# Patient Record
Sex: MALE | Race: WHITE | NOT HISPANIC OR LATINO | URBAN - METROPOLITAN AREA
[De-identification: names, ages, dates, MRNs, and addresses within clinical notes are randomized per-mention and may not be internally consistent; named-entity substitution may affect disease eponyms.]

---

## 2017-08-23 NOTE — PATIENT DISCUSSION
OS change centrally on OCT vs past.  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  Start with Ilevro qhs OS and Durezol bid OS and see retina in about 2 weeks to further eval.

## 2017-09-15 PROBLEM — H34.8322: Noted: 2017-09-15

## 2017-10-20 NOTE — PATIENT DISCUSSION
Based on today's exam, diagnostic studies, and review of records, along with the patient’s recent onset of functional difficulties which appear to be caused by the vitreous debris, it was recommended the PATIENT WAIT A FEW MONTHS to see if the vitreous debris clears on its own.

## 2017-10-20 NOTE — PATIENT DISCUSSION
Pt elects surgery. Will see patient in 4-5  weeks for Judson Joshi. Will discuss and schedule surgery for December at that time.

## 2017-10-20 NOTE — PATIENT DISCUSSION
Discussed possible vitrectomy due to patient being unhappy with current  VA. Patient states that his vision inhibits some daily activities. Floater affecting ADL. Pt states that vision started decreasing about 6 months after cataract surgery. Floaters started to appear in vision and since then [place] has been decreased.

## 2017-10-20 NOTE — PATIENT DISCUSSION
Pt educated that this condition causes a distortion in vision. As patient has no complaints od distortion, pt edu that VA complaints are likely due to floaters.

## 2017-11-20 NOTE — PATIENT DISCUSSION
Pt educated discontinue use of blood thinners 1 week prior to day of surgery, Pt educated vision will be blurry several  months after surgery and 25g PPV MP ICG Recommended.

## 2017-11-20 NOTE — PATIENT DISCUSSION
Discussed vtrectomy due to patient being unhappy with current  VA. Patient states that his vision inhibits some daily activities. Floater affecting ADL. Pt states that vision started decreasing about 6 months after cataract surgery. Floaters started to appear in vision and since then [place] has been decreased.

## 2017-12-14 NOTE — PATIENT DISCUSSION
Good PO progress. Post op instructions given to patient and verbalized understanding. Stressed [facility] will take 6m- 1yr to reach full potential. Most VA improvement in the first two months. GTTS instructions given to patient. No strenuous activity, no swimming etc. Can d/c face down position but no flat on back. May want to add additional laser in office. Ok to have MOHS surgery next week. Advised pt to call immediately if any eye pain or vision loss. 24/7 ER reviewed. Impossible to remove 100% of floaters. Decrease PA to QID AND Moxi continue for 2 more days then stop. Ok to play golf on Thursday.

## 2017-12-15 NOTE — PATIENT DISCUSSION
Good PO progress. Post op instructions given to patient and verbalized understanding. [facility] will take 6m- 1yr to reach full potential. Most VA improvement in the first two months. GTTS instructions given to patient. No strenuous activity, no swimming etc. Recc face down position for next 2-3 days. May want to add additional laser in office. Ok to have MOHS surgery next week. Advised pt to call immediately if any eye pain or vision loss. 24/7 ER reviewed. Impossible to remove 100% of floaters.

## 2017-12-19 NOTE — PATIENT DISCUSSION
Good PO progress. Post op instructions given to patient and verbalized understanding. [facility] will take 6m- 1yr to reach full potential. Most VA improvement in the first two months. GTTS instructions given to patient. No strenuous activity, no swimming etc. Can d/c face down position but no flat on back. May want to add additional laser in office. Ok to have MOHS surgery next week. Advised pt to call immediately if any eye pain or vision loss. 24/7 ER reviewed. Impossible to remove 100% of floaters. Decrease PA to QID AND Moxi continue for 2 more days then stop. Ok to play golf on Thursday.

## 2018-01-08 NOTE — PATIENT DISCUSSION
Good PO progress. Post op instructions given to patient and verbalized understanding. [facility] will take 6m- 1yr to reach full potential. Most VA improvement in the first two months. GTTS instructions given to patient. Continue with Pred A QID.  No strenuous activity, no swimming etc. May want to add additional laser in office.  Advised pt to call immediately if any eye pain or vision loss. 24/7 ER reviewed. Impossible to remove 100% of floaters.

## 2018-01-29 NOTE — PATIENT DISCUSSION
Decrease Prednisolone to two times a day for the next two weeks. Then decrease to one drop per day for the next two weeks.

## 2018-04-04 NOTE — PROCEDURE NOTE: CLINICAL
PROCEDURE NOTE: Biopsy of Eyelid Right Upper Lid. Diagnosis: Eyelid Lesion, Uncertain Behavior. Prior to treatment, the risks/benefits/alternatives were discussed. The patient wished to proceed with procedure. The area of the surgical site was prepped surgical scrub. 0.5 cc of 2% lidocaine with epinephrine was injected beneath the lesion. The lesion was excised with Nestor scissors. The defect was cauterized. Erythromycin ointment was placed on the biopsy site. Patient tolerated procedure well. There were no complications. The lesion was placed in formalin and sent to a pathology lab. Post procedure instructions given. Jaciel Damon

## 2018-04-04 NOTE — PATIENT DISCUSSION
Recommended excision/biopsy due to enlarging. Specimen sent for pathology. Pt tolerated procedur well.

## 2018-04-23 NOTE — PATIENT DISCUSSION
Recommended excision/biopsy due to enlarging. Specimen sent for pathology. Pt tolerated procedure well.

## 2020-01-15 NOTE — PATIENT DISCUSSION
Visually significant, recommend PPV/MP, patient elects to hold off on surgery for now, follow up in 2 months to further discuss surgery.

## 2020-01-22 NOTE — PATIENT DISCUSSION
Will remove at Dr. Kaylie Cobos and patient's request.  Due to size and location recommend removing in 2-3 stages.  Patient desires to proceed.  Consent form signed.  Photos taken.  Yair RUBIN until healed (pt has).  Follow up 2-3 weeks for removal with Laurel Cunningham PA-C.

## 2020-01-30 NOTE — PROCEDURE NOTE: SURGICAL
Prior to commencing surgery, Dr. Sumanth Carr confirmed patient identification, surgical procedure, site and side. Following topical proparacaine anesthesia, the patient was positioned at the YAG laser, a contact lens was coupled to the cornea of the right eye with methylcellulose and an axial posterior capsulotomy was performed without complication using 2.9 Mj x 23. Attention was turned to the left eye and a contact lens was coupled to the cornea of the left eye with methylcellulose and an axial posterior capsulotomy was performed without complication using 3.0 Mj x 30. Excess methylcellulose was washed from both eyes, one drop of Alphagan was instilled in each eye and the patient returned to the holding area having tolerated the procedure well and without complication. <br />[id number]<QQ /><br />

## 2020-02-05 NOTE — PROCEDURE NOTE: CLINICAL
PROCEDURE NOTE: Excision of Eyelid Lesion Right Upper Lid. Diagnosis: Eyelid Lesion, Benign. Risks, benefits and alternatives discussed. Patient desires to proceed with excision of growth/lesion today. The involved area was anesthetized using 1% lidocaine with epi. The lesion was excised using mini Westcotts and forceps and discarded. The wound was cauterized to achieve hemostasis. Erythromycin ophthalmic ointment was applied. Post op instructions were given to the patient. The patient tolerated the procedure well and left in good condition. The patient understands to call us for any concerns. Jordana Walker

## 2020-02-05 NOTE — PATIENT DISCUSSION
Patient desires to have removed and is aware that it will take at least one more time for complete removal.   Consent form signed.  Photos taken.  Scab from previous excision also removed.  Expressed importance of keeping the wound moist with Yair cadence TID until it heals.  Patient may return in 2-3 weeks for further removal.

## 2020-02-24 NOTE — PATIENT DISCUSSION
Patient desires to have remainder of growth removed today.  Consent form signed.  Photos taken.  Expressed importance of keeping the wound moist with Yair cadence TID until it heals.  Follow up prn.

## 2020-02-24 NOTE — PROCEDURE NOTE: CLINICAL
PROCEDURE NOTE: Excision of Eyelid Lesion Right Upper Lid. Diagnosis: Eyelid Lesion, Benign. Risks, benefits and alternatives discussed. Patient desires to proceed with excision of growth/lesion today. A drop of proparacaine was instilled in the involved eye. A tetracaine drop was used on a cotton tipped applicator and placed on the conjunctiva. The involved area was anesthetized using 1% lidocaine with epi. The lesion was excised using mini Westcotts and forceps and discarded. The wound was cauterized to achieve hemostasis. Erythromycin ophthalmic ointment was applied. Post op instructions were given to the patient. The patient tolerated the procedure well and left in good condition. The patient understands to call us for any concerns. Brandee Moore

## 2021-08-03 NOTE — PATIENT DISCUSSION
Doing well, NO new TEARS/BREAKS/DETACHMENT seen on exam TODAY. I called [facility] and left a msg with the verbal ok Per Dr. Roberson for the orders.  Sangita Khan MA

## 2021-12-29 ENCOUNTER — NEW PATIENT (OUTPATIENT)
Dept: URBAN - METROPOLITAN AREA CLINIC 53 | Facility: CLINIC | Age: 61
End: 2021-12-29

## 2021-12-29 DIAGNOSIS — H25.813: ICD-10-CM

## 2021-12-29 DIAGNOSIS — H34.8322: ICD-10-CM

## 2021-12-29 PROCEDURE — 92134 CPTRZ OPH DX IMG PST SGM RTA: CPT

## 2021-12-29 PROCEDURE — 92004 COMPRE OPH EXAM NEW PT 1/>: CPT

## 2021-12-29 ASSESSMENT — VISUAL ACUITY
OD_GLARE: 20/20
OD_SC: J4
OS_SC: J4
OS_SC: 20/20
OS_GLARE: 20/20
OD_GLARE: 20/20
OS_GLARE: 20/20
OD_SC: 20/20

## 2021-12-29 ASSESSMENT — TONOMETRY
OS_IOP_MMHG: 18
OD_IOP_MMHG: 18

## 2022-01-11 ENCOUNTER — CONSULTATION/EVALUATION (OUTPATIENT)
Dept: URBAN - METROPOLITAN AREA CLINIC 53 | Facility: CLINIC | Age: 62
End: 2022-01-11

## 2022-01-11 DIAGNOSIS — H25.813: ICD-10-CM

## 2022-01-11 DIAGNOSIS — H40.013: ICD-10-CM

## 2022-01-11 DIAGNOSIS — H34.8322: ICD-10-CM

## 2022-01-11 PROCEDURE — 92134 CPTRZ OPH DX IMG PST SGM RTA: CPT

## 2022-01-11 PROCEDURE — 92014 COMPRE OPH EXAM EST PT 1/>: CPT

## 2022-01-11 ASSESSMENT — VISUAL ACUITY
OD_SC: 20/20-1
OS_SC: 20/20-1

## 2022-01-11 ASSESSMENT — TONOMETRY
OD_IOP_MMHG: 17
OS_IOP_MMHG: 18

## 2022-05-10 ENCOUNTER — DIAGNOSTICS ONLY (OUTPATIENT)
Dept: URBAN - METROPOLITAN AREA CLINIC 53 | Facility: CLINIC | Age: 62
End: 2022-05-10

## 2022-05-10 DIAGNOSIS — H40.013: ICD-10-CM

## 2022-05-10 PROCEDURE — 92083 EXTENDED VISUAL FIELD XM: CPT

## 2022-05-10 PROCEDURE — 92133 CPTRZD OPH DX IMG PST SGM ON: CPT

## 2022-10-25 NOTE — PATIENT DISCUSSION
IOP 18/19. Last testing completed 5/10/2022. Due to large c/d 0.65 and 0.8 will continue to monitor patient. Will order testing for 6 months [facility] 24-2 SOFIA standard and OCT (RNFL). Patient to keep next scheduled appointment with Dr. Sara Rojas 1/11/2023.

## 2023-01-11 NOTE — PATIENT DISCUSSION
IOP stable at 17/17. Last testing completed 5/10/2022. Due to large c/d 0.65 and 0.8 will continue to monitor patient. Will order testing for 5 months [facility] 24-2 SOFIA Fast and OCT (RNFL)/ IOP Check.

## 2024-03-15 NOTE — PATIENT DISCUSSION
Discussed the options of vitrectomy surgery with membrane peeling vs. observation alone. Risks, benefits and alternatives were discussed in detail. All questions were answered. Plan: Extracted with CTA Detail Level: Simple